# Patient Record
Sex: MALE | Race: ASIAN | NOT HISPANIC OR LATINO
[De-identification: names, ages, dates, MRNs, and addresses within clinical notes are randomized per-mention and may not be internally consistent; named-entity substitution may affect disease eponyms.]

---

## 2022-05-09 PROBLEM — Z00.00 ENCOUNTER FOR PREVENTIVE HEALTH EXAMINATION: Status: ACTIVE | Noted: 2022-05-09

## 2022-05-17 ENCOUNTER — APPOINTMENT (OUTPATIENT)
Dept: GERIATRICS | Facility: CLINIC | Age: 87
End: 2022-05-17
Payer: COMMERCIAL

## 2022-05-17 ENCOUNTER — NON-APPOINTMENT (OUTPATIENT)
Age: 87
End: 2022-05-17

## 2022-05-17 VITALS
HEIGHT: 63 IN | DIASTOLIC BLOOD PRESSURE: 60 MMHG | HEART RATE: 80 BPM | WEIGHT: 105 LBS | OXYGEN SATURATION: 97 % | BODY MASS INDEX: 18.61 KG/M2 | SYSTOLIC BLOOD PRESSURE: 100 MMHG

## 2022-05-17 DIAGNOSIS — F51.04 PSYCHOPHYSIOLOGIC INSOMNIA: ICD-10-CM

## 2022-05-17 DIAGNOSIS — K80.20 CALCULUS OF GALLBLADDER W/OUT CHOLECYSTITIS W/OUT OBSTRUCTION: ICD-10-CM

## 2022-05-17 DIAGNOSIS — Z87.438 PERSONAL HISTORY OF OTHER DISEASES OF MALE GENITAL ORGANS: ICD-10-CM

## 2022-05-17 DIAGNOSIS — J30.2 OTHER SEASONAL ALLERGIC RHINITIS: ICD-10-CM

## 2022-05-17 PROCEDURE — 99205 OFFICE O/P NEW HI 60 MIN: CPT

## 2022-05-17 PROCEDURE — 99072 ADDL SUPL MATRL&STAF TM PHE: CPT

## 2022-05-17 RX ORDER — BISACODYL 5 MG/1
5 TABLET ORAL
Refills: 0 | Status: ACTIVE | COMMUNITY
Start: 2022-05-17

## 2022-05-17 NOTE — ASSESSMENT
[FreeTextEntry1] : pt with complicated medical history as above\par will try to consolidate care/communications at [place]\par needs pulmonary and cardiologist input\par consider consult sanaz wray AAA - 5.3 cm\par check on pneumonia vaccines status\par refuses  home PT today\par will check labs today\par debrox rt ear\par

## 2022-05-17 NOTE — HISTORY OF PRESENT ILLNESS
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

## 2022-05-17 NOTE — PHYSICAL EXAM
[Alert] : alert [No Acute Distress] : in no acute distress [No Respiratory Distress] : no respiratory distress [No Acc Muscle Use] : no accessory muscle use [Respiration, Rhythm And Depth] : normal respiratory rhythm and effort [Auscultation Breath Sounds / Voice Sounds] : lungs were clear to auscultation bilaterally [Normal S1, S2] : normal S1 and S2 [Heart Rate And Rhythm] : heart rate was normal and rhythm regular [Edema] : edema was not present [Abdomen Soft] : soft [Cervical Lymph Nodes Enlarged Posterior Bilaterally] : posterior cervical [Supraclavicular Lymph Nodes Enlarged Bilaterally] : supraclavicular [Normal] : normal skin color and pigmentation [] : no rash [Skin Lesions] : no skin lesions [de-identified] : thin frail elderly man - needs assist to stand and transfer [de-identified] : huge impacted cerumen on rt [de-identified] : midline surgical scar [de-identified] : nails clipped [de-identified] : nees assistance

## 2022-05-17 NOTE — REASON FOR VISIT
[Initial Evaluation] : an initial evaluation [Family Member] : family member [FreeTextEntry3] : friend -

## 2022-05-17 NOTE — REVIEW OF SYSTEMS
[Recent Weight Loss (___ Lbs)] : recent [unfilled] ~Ulb weight loss [Feeling Tired] : feeling tired [Eyesight Problems] : eyesight problems [Loss Of Hearing] : hearing loss [Shortness Of Breath] : shortness of breath [Incontinence] : incontinence [Arthralgias] : arthralgias [Joint Stiffness] : joint stiffness [Difficulty Walking] : difficulty walking [Muscle Weakness] : muscle weakness [Negative] : Heme/Lymph [FreeTextEntry2] : eating better now [FreeTextEntry3] : has optho appt [FreeTextEntry6] : phlegm

## 2022-05-19 ENCOUNTER — LABORATORY RESULT (OUTPATIENT)
Age: 87
End: 2022-05-19

## 2022-07-21 ENCOUNTER — APPOINTMENT (OUTPATIENT)
Dept: GERIATRICS | Facility: HOME HEALTH | Age: 87
End: 2022-07-21

## 2022-07-21 DIAGNOSIS — K59.09 OTHER CONSTIPATION: ICD-10-CM

## 2022-07-21 PROCEDURE — 99349 HOME/RES VST EST MOD MDM 40: CPT

## 2022-07-21 RX ORDER — IPRATROPIUM BROMIDE 42 UG/1
0.06 SPRAY NASAL 3 TIMES DAILY
Qty: 1 | Refills: 5 | Status: ACTIVE | COMMUNITY
Start: 2022-07-21 | End: 1900-01-01

## 2022-07-21 NOTE — ASSESSMENT
[FreeTextEntry1] : pt with complicated medical history as above\par will try to consolidate care/communications at [place]\par needs pulmonary and cardiologist input\par consider consult sanaz wray AAA - 5.3 cm\par check on pneumonia vaccines status\par refuses  home PT today\par will check labs today\par debrox rt ear\par \par \par 7/21/22\par pt is feeling much better\par does not want intervention on aneurysm at this time\par walking better -\par breathing better\par labs wnl\par has all meds

## 2022-07-21 NOTE — REVIEW OF SYSTEMS
[Feeling Tired] : feeling tired [Recent Weight Loss (___ Lbs)] : recent [unfilled] ~Ulb weight loss [Eyesight Problems] : eyesight problems [Loss Of Hearing] : hearing loss [Incontinence] : incontinence [Arthralgias] : arthralgias [Joint Stiffness] : joint stiffness [Difficulty Walking] : difficulty walking [Muscle Weakness] : muscle weakness [Negative] : Heme/Lymph [Constipation] : constipation [FreeTextEntry2] : eating better now [FreeTextEntry3] : has optho appt [FreeTextEntry6] : phlegm [FreeTextEntry7] : soft stool but only every few days - uses senna [FreeTextEntry9] : walking some in home [de-identified] : better

## 2022-07-21 NOTE — HISTORY OF PRESENT ILLNESS
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\par h/o aortic valve replaced in [place] 2007\par elevated bilirubin - mild ascites, gallstones\par \par he had covid  vaccines - booster  in April\par had pneumonia vaccines (?details)\par \par pmd - Dr CARSON - [phone] \par Dr Tapia - cards\par \par retired     \par \par 7/21/22\par pt is seen at home with wife and daughter\par I am new MD for him\par he is feeling very  well\par no shortness of breath\par no chest pain\par did not use debrox drops\par brought copy of labs for for him and reviewed\par encourage banaana and is on extra Kdur tabs\par \par eating well\par went to podiatry -\par got debrox but did not use\par his pills are all blister paked - complinace is good

## 2022-07-21 NOTE — PHYSICAL EXAM
[Alert] : alert [No Respiratory Distress] : no respiratory distress [No Acc Muscle Use] : no accessory muscle use [Respiration, Rhythm And Depth] : normal respiratory rhythm and effort [Auscultation Breath Sounds / Voice Sounds] : lungs were clear to auscultation bilaterally [Normal S1, S2] : normal S1 and S2 [Heart Rate And Rhythm] : heart rate was normal and rhythm regular [Edema] : edema was not present [Abdomen Soft] : soft [Cervical Lymph Nodes Enlarged Posterior Bilaterally] : posterior cervical [Supraclavicular Lymph Nodes Enlarged Bilaterally] : supraclavicular [Normal] : normal skin color and pigmentation [] : no rash [Skin Lesions] : no skin lesions [de-identified] : thin frail elderly man - needs assist to stand and transfer - doing better can walk across room [de-identified] : huge impacted cerumen on rt - got tiny bit out [de-identified] : midline surgical scar [de-identified] : nails clipped [de-identified] : nees assistance

## 2022-09-29 ENCOUNTER — APPOINTMENT (OUTPATIENT)
Dept: GERIATRICS | Facility: HOME HEALTH | Age: 87
End: 2022-09-29

## 2022-09-29 VITALS
DIASTOLIC BLOOD PRESSURE: 65 MMHG | HEART RATE: 86 BPM | TEMPERATURE: 96.6 F | SYSTOLIC BLOOD PRESSURE: 105 MMHG | OXYGEN SATURATION: 99 % | WEIGHT: 101.8 LBS | BODY MASS INDEX: 18.03 KG/M2

## 2022-09-29 PROCEDURE — 99349 HOME/RES VST EST MOD MDM 40: CPT

## 2022-09-29 PROCEDURE — 90662 IIV NO PRSV INCREASED AG IM: CPT

## 2022-09-29 PROCEDURE — G0008: CPT

## 2022-09-29 NOTE — REVIEW OF SYSTEMS
[Feeling Tired] : feeling tired [Eyesight Problems] : eyesight problems [Loss Of Hearing] : hearing loss [Constipation] : constipation [Incontinence] : incontinence [Arthralgias] : arthralgias [Joint Stiffness] : joint stiffness [Difficulty Walking] : difficulty walking [Muscle Weakness] : muscle weakness [Recent Weight Loss (___ Lbs)] : recent [unfilled] ~Ulb weight loss [Chest Pain] : no chest pain [Palpitations] : no palpitations [Lower Ext Edema] : no extremity edema [Shortness Of Breath] : no shortness of breath [Negative] : Integumentary [FreeTextEntry2] : has boost [FreeTextEntry3] : has optho appt and cardiologist  - pending Nov [FreeTextEntry6] : phlegm - in the night time  [FreeTextEntry9] : walking some in home- better [de-identified] : better

## 2022-09-29 NOTE — PHYSICAL EXAM
[Alert] : alert [No Respiratory Distress] : no respiratory distress [No Acc Muscle Use] : no accessory muscle use [Respiration, Rhythm And Depth] : normal respiratory rhythm and effort [Auscultation Breath Sounds / Voice Sounds] : lungs were clear to auscultation bilaterally [Normal S1, S2] : normal S1 and S2 [Heart Rate And Rhythm] : heart rate was normal and rhythm regular [Edema] : edema was not present [Abdomen Soft] : soft [Cervical Lymph Nodes Enlarged Posterior Bilaterally] : posterior cervical [Supraclavicular Lymph Nodes Enlarged Bilaterally] : supraclavicular [Normal] : normal skin color and pigmentation [] : no rash [Skin Lesions] : no skin lesions [de-identified] : thin frail elderly man - needs assist to stand and transfer - doing better can walk across room [de-identified] : huge impacted cerumen on rt - did not yet do debrox [de-identified] : midline surgical scar [de-identified] : nails clipped [de-identified] : needs assistance

## 2022-09-29 NOTE — ASSESSMENT
[FreeTextEntry1] : pt with complicated medical history as above\par will try to consolidate care/communications at [place]\par needs pulmonary and cardiologist input\par consider consult sanaz wray AAA - 5.3 cm\par check on pneumonia vaccines status\par refuses  home PT today\par will check labs today\par debrox rt ear\par \par \par 7/21/22\par pt is feeling much better\par does not want intervention on aneurysm at this time\par walking better -\par breathing better\par labs wnl\par has all meds\par \par 9/29/22\par given flu vaccine left amr\par needs covid vaccines\par check on pneumonia vaccines - daughter states she will do\par needs to do   debrox\par vss\par encouraged to drink boost - weight is down 3 lbs\par afib stable - \par vss

## 2022-09-29 NOTE — HISTORY OF PRESENT ILLNESS
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\par h/o aortic valve replaced in [place] 2007\par elevated bilirubin - mild ascites, gallstones\par \par he had covid  vaccines - booster  in April\par had pneumonia vaccines (?details)\par \par pmd - Dr CARSON - [phone] \par Dr Tapia - cards\par \par retired     \par \par 7/21/22\par pt is seen at home with wife and daughter\par I am new MD for him\par he is feeling very  well\par no shortness of breath\par no chest pain\par did not use debrox drops\par brought copy of labs for for him and reviewed\par encourage banaana and is on extra Kdur tabs\par \par eating well\par went to podiatry -\par got debrox but did not use\par his pills are all blister packed - complinace is good\par \par 9/29/22\par daughter\par [phone] with him and wife\par needs flu vaccine\par has been stable no hospitalizations\par need to get info re pneumonia  vaccines\par needs covid booster\par \par did not use debrox - will do next time

## 2022-10-18 ENCOUNTER — NON-APPOINTMENT (OUTPATIENT)
Age: 87
End: 2022-10-18

## 2022-11-03 RX ORDER — IPRATROPIUM BROMIDE 42 UG/1
0.06 SPRAY NASAL 3 TIMES DAILY
Qty: 540 | Refills: 3 | Status: ACTIVE | COMMUNITY
Start: 2022-05-17 | End: 1900-01-01

## 2022-11-03 RX ORDER — AZELASTINE HYDROCHLORIDE AND FLUTICASONE PROPIONATE 137; 50 UG/1; UG/1
137-50 SPRAY, METERED NASAL TWICE DAILY
Qty: 3 | Refills: 3 | Status: ACTIVE | COMMUNITY
Start: 2022-05-17 | End: 1900-01-01

## 2022-11-03 RX ORDER — ALBUTEROL SULFATE 90 UG/1
108 (90 BASE) AEROSOL, METERED RESPIRATORY (INHALATION)
Qty: 3 | Refills: 1 | Status: ACTIVE | COMMUNITY
Start: 2022-07-21 | End: 1900-01-01

## 2022-11-03 RX ORDER — MULTIVIT-MIN/FOLIC/VIT K/LYCOP 400-300MCG
10 MCG TABLET ORAL DAILY
Qty: 90 | Refills: 3 | Status: ACTIVE | COMMUNITY
Start: 2022-05-17

## 2022-11-03 RX ORDER — SENNOSIDES 8.6 MG
8.6 TABLET ORAL
Qty: 180 | Refills: 3 | Status: ACTIVE | COMMUNITY
Start: 2022-05-17 | End: 1900-01-01

## 2022-11-03 RX ORDER — FLUTICASONE FUROATE, UMECLIDINIUM BROMIDE AND VILANTEROL TRIFENATATE 200; 62.5; 25 UG/1; UG/1; UG/1
200-62.5-25 POWDER RESPIRATORY (INHALATION)
Qty: 3 | Refills: 3 | Status: ACTIVE | COMMUNITY
Start: 2022-05-17 | End: 1900-01-01

## 2022-11-03 RX ORDER — CHLORHEXIDINE GLUCONATE 4 %
1000 LIQUID (ML) TOPICAL
Qty: 90 | Refills: 3 | Status: ACTIVE | COMMUNITY
Start: 2022-05-17

## 2022-11-07 ENCOUNTER — OFFICE (OUTPATIENT)
Dept: URBAN - METROPOLITAN AREA CLINIC 122 | Facility: CLINIC | Age: 87
Setting detail: OPHTHALMOLOGY
End: 2022-11-07
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
## 2022-11-07 PROCEDURE — NO SHOW FE NO SHOW FEE: Performed by: OPHTHALMOLOGY

## 2022-12-14 ENCOUNTER — APPOINTMENT (OUTPATIENT)
Dept: GERIATRICS | Facility: HOME HEALTH | Age: 87
End: 2022-12-14

## 2022-12-14 VITALS
OXYGEN SATURATION: 98 % | DIASTOLIC BLOOD PRESSURE: 70 MMHG | SYSTOLIC BLOOD PRESSURE: 110 MMHG | BODY MASS INDEX: 18.6 KG/M2 | HEART RATE: 91 BPM | WEIGHT: 105 LBS

## 2022-12-14 PROCEDURE — G0009: CPT

## 2022-12-14 PROCEDURE — 99348 HOME/RES VST EST LOW MDM 30: CPT

## 2022-12-14 PROCEDURE — 90677 PCV20 VACCINE IM: CPT

## 2022-12-14 NOTE — REVIEW OF SYSTEMS
[Feeling Tired] : feeling tired [Eyesight Problems] : eyesight problems [Loss Of Hearing] : hearing loss [Constipation] : constipation [Incontinence] : incontinence [Arthralgias] : arthralgias [Joint Stiffness] : joint stiffness [Difficulty Walking] : difficulty walking [Muscle Weakness] : muscle weakness [Negative] : Heme/Lymph [Recent Weight Gain (___ Lbs)] : recent [unfilled] ~Ulb weight gain [Skin Wound] : skin wound [Recent Weight Loss (___ Lbs)] : no recent weight loss [Chest Pain] : no chest pain [Palpitations] : no palpitations [Lower Ext Edema] : no extremity edema [Shortness Of Breath] : no shortness of breath [FreeTextEntry2] : has boost [FreeTextEntry3] : has optho appt and cardiologist  - pending Nov [FreeTextEntry6] : phlegm - in the night time  [FreeTextEntry9] : walking some in home- better [de-identified] : mid chest [de-identified] : better

## 2022-12-14 NOTE — HISTORY OF PRESENT ILLNESS
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\par h/o aortic valve replaced in [place] 2007\par elevated bilirubin - mild ascites, gallstones\par \par he had covid  vaccines - booster  in April\par had pneumonia vaccines (?details)\par \par pmd - Dr CARSON - [phone] \par Dr Tapia - cards\par \par retired     \par \par 7/21/22\par pt is seen at home with wife and daughter\par I am new MD for him\par he is feeling very  well\par no shortness of breath\par no chest pain\par did not use debrox drops\par brought copy of labs for for him and reviewed\par encourage banaana and is on extra Kdur tabs\par \par eating well\par went to podiatry -\par got debrox but did not use\par his pills are all blister packed - complinace is good\par \par 9/29/22\par daughter\par [phone] with him and wife\par needs flu vaccine\par has been stable no hospitalizations\par need to get info re pneumonia  vaccines\par needs covid booster\par \par did not use debrox - will do next time\par \par 12/14/22\par pt has been well\par eating well\par moved apts to be more accesible\par uses walker - sob on exertion\par going to ear dr today\par needs prevnar 20 - had prevnar 13 only in 2018\par has had flu vaccine and covid booster\par there is infected skin lesion mid chest - photo taken

## 2022-12-14 NOTE — PHYSICAL EXAM
[Alert] : alert [No Respiratory Distress] : no respiratory distress [No Acc Muscle Use] : no accessory muscle use [Respiration, Rhythm And Depth] : normal respiratory rhythm and effort [Auscultation Breath Sounds / Voice Sounds] : lungs were clear to auscultation bilaterally [Normal S1, S2] : normal S1 and S2 [Heart Rate And Rhythm] : heart rate was normal and rhythm regular [Edema] : edema was not present [Cervical Lymph Nodes Enlarged Posterior Bilaterally] : posterior cervical [Supraclavicular Lymph Nodes Enlarged Bilaterally] : supraclavicular [] : no rash [Skin Lesions] : no skin lesions [Normal] : normal affect and normal mood [de-identified] : thin frail elderly man - needs assist to stand and transfer - doing better can walk across room [de-identified] : midline surgical scar, small infected red area - photo taken [de-identified] : needs assistance

## 2022-12-14 NOTE — ASSESSMENT
[FreeTextEntry1] : pt with complicated medical history as above\par will try to consolidate care/communications at [facility]\par needs pulmonary and cardiologist input\par consider consult sanaz wray AAA - 5.3 cm\par check on pneumonia vaccines status\par refuses  home PT today\par will check labs today\par debrox rt ear\par \par \par 7/21/22\par pt is feeling much better\par does not want intervention on aneurysm at this time\par walking better -\par breathing better\par labs wnl\par has all meds\par \par 9/29/22\par given flu vaccine left amr\par needs covid vaccines\par check on pneumonia vaccines - daughter states she will do\par needs to do   debrox\par vss\par encouraged to drink boost - weight is down 3 lbs\par afib stable - \par vss\par \par 12/14/22\par pt is doing well\par gave prevnar 20 in left arm\par has had flu and  covid vaccines\par going to ENT\par called in bactroban for skin infection - took photo

## 2023-02-08 ENCOUNTER — APPOINTMENT (OUTPATIENT)
Dept: GERIATRICS | Facility: HOME HEALTH | Age: 88
End: 2023-02-08
Payer: COMMERCIAL

## 2023-02-08 VITALS
BODY MASS INDEX: 18.81 KG/M2 | WEIGHT: 106.2 LBS | HEART RATE: 86 BPM | SYSTOLIC BLOOD PRESSURE: 110 MMHG | OXYGEN SATURATION: 98 % | DIASTOLIC BLOOD PRESSURE: 70 MMHG | TEMPERATURE: 97.3 F

## 2023-02-08 DIAGNOSIS — B96.89 LOCAL INFECTION OF THE SKIN AND SUBCUTANEOUS TISSUE, UNSPECIFIED: ICD-10-CM

## 2023-02-08 DIAGNOSIS — L08.9 LOCAL INFECTION OF THE SKIN AND SUBCUTANEOUS TISSUE, UNSPECIFIED: ICD-10-CM

## 2023-02-08 PROCEDURE — 99349 HOME/RES VST EST MOD MDM 40: CPT

## 2023-02-08 NOTE — PHYSICAL EXAM
[Alert] : alert [No Respiratory Distress] : no respiratory distress [No Acc Muscle Use] : no accessory muscle use [Respiration, Rhythm And Depth] : normal respiratory rhythm and effort [Auscultation Breath Sounds / Voice Sounds] : lungs were clear to auscultation bilaterally [Normal S1, S2] : normal S1 and S2 [Heart Rate And Rhythm] : heart rate was normal and rhythm regular [Edema] : edema was not present [Cervical Lymph Nodes Enlarged Posterior Bilaterally] : posterior cervical [Supraclavicular Lymph Nodes Enlarged Bilaterally] : supraclavicular [] : no rash [Skin Lesions] : no skin lesions [Normal] : normal affect and normal mood [de-identified] : thin frail elderly man - needs assist to stand and transfer - doing better can walk across room [de-identified] : midline surgical scar, small infected red area - photo taken - worse errythema tender - since 12/14 [de-identified] : needs assistance

## 2023-02-08 NOTE — ASSESSMENT
[FreeTextEntry1] : pt with complicated medical history as above\par will try to consolidate care/communications at [place]\par needs pulmonary and cardiologist input\par consider consult sanaz camarena re AAA - 5.3 cm\par check on pneumonia vaccines status\par refuses  home PT today\par will check labs today\par debrox rt ear\par \par \par 7/21/22\par pt is feeling much better\par does not want intervention on aneurysm at this time\par walking better -\par breathing better\par labs wnl\par has all meds\par \par 9/29/22\par given flu vaccine left amr\par needs covid vaccines\par check on pneumonia vaccines - daughter states she will do\par needs to do   debrox\par vss\par encouraged to drink boost - weight is down 3 lbs\par afib stable - \par vss\par \par 12/14/22\par pt is doing well\par gave prevnar 20 in left arm\par has had flu and  covid vaccines\par going to ENT\par called in bactroban for skin infection - took photo\par \par 2/8/23\par skin lesion is worse\par did not use bactroban\par if does not resovle - needs derm\par daughter aware\par \par checking labs\par \par using mucinex\par \par [place] - stable\par copd stable with med\par \par meds are blister packed\par

## 2023-02-08 NOTE — REVIEW OF SYSTEMS
[Feeling Tired] : feeling tired [Recent Weight Gain (___ Lbs)] : recent [unfilled] ~Ulb weight gain [Eyesight Problems] : eyesight problems [Loss Of Hearing] : hearing loss [Constipation] : constipation [Incontinence] : incontinence [Arthralgias] : arthralgias [Joint Stiffness] : joint stiffness [Skin Wound] : skin wound [Difficulty Walking] : difficulty walking [Muscle Weakness] : muscle weakness [Negative] : Heme/Lymph [Recent Weight Loss (___ Lbs)] : no recent weight loss [Chest Pain] : no chest pain [Palpitations] : no palpitations [Lower Ext Edema] : no extremity edema [Shortness Of Breath] : no shortness of breath [FreeTextEntry2] : has boost [FreeTextEntry3] : has optho appt and cardiologist  - pending Nov [FreeTextEntry6] : phlegm - in the night time  [FreeTextEntry9] : walking some in home- better [de-identified] : mid chest [de-identified] : better

## 2023-02-08 NOTE — HISTORY OF PRESENT ILLNESS
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\par h/o aortic valve replaced in [place] 2007\par elevated bilirubin - mild ascites, gallstones\par \par he had covid  vaccines - booster  in April\par had pneumonia vaccines (?details)\par \par pmd - Dr CARSON - [phone] \par Dr Tapia - cards\par \par retired     \par \par 7/21/22\par pt is seen at home with wife and daughter\par I am new MD for him\par he is feeling very  well\par no shortness of breath\par no chest pain\par did not use debrox drops\par brought copy of labs for for him and reviewed\par encourage banaana and is on extra Kdur tabs\par \par eating well\par went to podiatry -\par got debrox but did not use\par his pills are all blister packed - complinace is good\par \par 9/29/22\par daughter\par [phone] with him and wife\par needs flu vaccine\par has been stable no hospitalizations\par need to get info re pneumonia  vaccines\par needs covid booster\par \par did not use debrox - will do next time\par \par 12/14/22\par pt has been well\par eating well\par moved apts to be more accesible\par uses walker - sob on exertion\par going to ear dr today\par needs prevnar 20 - had prevnar 13 only in 2018\par has had flu vaccine and covid booster\par there is infected skin lesion mid chest - photo taken\par \par 2/8/23\par pt has been well\par no hospitalizations\par doing calligraphy\par breathing is better\par does follow with cardiologist\par has chronic cough sputum\par daughter bought mucinex but has not tried\par skin lesion on ches appears more inflamed

## 2023-02-10 ENCOUNTER — LABORATORY RESULT (OUTPATIENT)
Age: 88
End: 2023-02-10

## 2023-02-13 ENCOUNTER — LABORATORY RESULT (OUTPATIENT)
Age: 88
End: 2023-02-13

## 2023-02-15 ENCOUNTER — LABORATORY RESULT (OUTPATIENT)
Age: 88
End: 2023-02-15

## 2023-02-16 ENCOUNTER — LABORATORY RESULT (OUTPATIENT)
Age: 88
End: 2023-02-16

## 2023-02-20 ENCOUNTER — LABORATORY RESULT (OUTPATIENT)
Age: 88
End: 2023-02-20

## 2023-05-03 ENCOUNTER — APPOINTMENT (OUTPATIENT)
Dept: GERIATRICS | Facility: HOME HEALTH | Age: 88
End: 2023-05-03
Payer: COMMERCIAL

## 2023-05-03 VITALS
OXYGEN SATURATION: 97 % | WEIGHT: 109.4 LBS | HEART RATE: 83 BPM | DIASTOLIC BLOOD PRESSURE: 70 MMHG | BODY MASS INDEX: 19.38 KG/M2 | SYSTOLIC BLOOD PRESSURE: 115 MMHG | TEMPERATURE: 97.4 F

## 2023-05-03 DIAGNOSIS — I71.20 THORACIC AORTIC ANEURYSM, WITHOUT RUPTURE, UNSPECIFIED: ICD-10-CM

## 2023-05-03 PROCEDURE — 99349 HOME/RES VST EST MOD MDM 40: CPT

## 2023-05-03 NOTE — PHYSICAL EXAM
[Alert] : alert [No Respiratory Distress] : no respiratory distress [No Acc Muscle Use] : no accessory muscle use [Respiration, Rhythm And Depth] : normal respiratory rhythm and effort [Auscultation Breath Sounds / Voice Sounds] : lungs were clear to auscultation bilaterally [Normal S1, S2] : normal S1 and S2 [Heart Rate And Rhythm] : heart rate was normal and rhythm regular [Edema] : edema was not present [Cervical Lymph Nodes Enlarged Posterior Bilaterally] : posterior cervical [Supraclavicular Lymph Nodes Enlarged Bilaterally] : supraclavicular [] : no rash [Skin Lesions] : no skin lesions [Normal] : normal affect and normal mood [No Oral Pallor] : no oral pallor [No Oral Cyanosis] : no oral cyanosis [Normal Outer Ear/Nose] : the ears and nose were normal in appearance [Normal Lips/Gums] : the lips and gums were normal [No Spinal Tenderness] : no spinal tenderness [de-identified] : thin frail elderly man - looks well - doing better can walk across room [de-identified] : midline surgical scar, small infected red area - photo taken - worse errythema tender - since 12/14 [de-identified] : needs assistance [de-identified] : lesion on chest not healing

## 2023-05-03 NOTE — REVIEW OF SYSTEMS
[Feeling Tired] : feeling tired [Recent Weight Gain (___ Lbs)] : recent [unfilled] ~Ulb weight gain [Eyesight Problems] : eyesight problems [Loss Of Hearing] : hearing loss [Constipation] : constipation [Incontinence] : incontinence [Arthralgias] : arthralgias [Joint Stiffness] : joint stiffness [Skin Wound] : skin wound [Difficulty Walking] : difficulty walking [Muscle Weakness] : muscle weakness [Negative] : Heme/Lymph [Recent Weight Loss (___ Lbs)] : no recent weight loss [Chest Pain] : no chest pain [Palpitations] : no palpitations [Lower Ext Edema] : no extremity edema [Shortness Of Breath] : no shortness of breath [FreeTextEntry2] : has boost [FreeTextEntry3] : has optho appt and cardiologist  - pending Nov [FreeTextEntry6] : phlegm - in the night time  [FreeTextEntry9] : walking some in home- better [de-identified] : mid chest [de-identified] : better

## 2023-05-03 NOTE — HISTORY OF PRESENT ILLNESS
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\par h/o aortic valve replaced in [place] 2007\par elevated bilirubin - mild ascites, gallstones\par \par he had covid  vaccines - booster  in April\par had pneumonia vaccines (?details)\par \par pmd - Dr CARSON - [phone] \par Dr Tapia - cards\par \par retired     \par \par 7/21/22\par pt is seen at home with wife and daughter\par I am new MD for him\par he is feeling very  well\par no shortness of breath\par no chest pain\par did not use debrox drops\par brought copy of labs for for him and reviewed\par encourage banaana and is on extra Kdur tabs\par \par eating well\par went to podiatry -\par got debrox but did not use\par his pills are all blister packed - complinace is good\par \par 9/29/22\par daughter\par [phone] with him and wife\par needs flu vaccine\par has been stable no hospitalizations\par need to get info re pneumonia  vaccines\par needs covid booster\par \par did not use debrox - will do next time\par \par 12/14/22\par pt has been well\par eating well\par moved apts to be more accesible\par uses walker - sob on exertion\par going to ear dr today\par needs prevnar 20 - had prevnar 13 only in 2018\par has had flu vaccine and covid booster\par there is infected skin lesion mid chest - photo taken\par \par 2/8/23\par pt has been well\par no hospitalizations\par doing calligraphy\par breathing is better\par does follow with cardiologist\par has chronic cough sputum\par daughter bought mucinex but has not tried\par skin lesion on chest appears more inflamed\par \par 5/3/23\par here with wife and daughter\par pt hss been feeling well\par denies sob on exertion\par [facility]\par had cardiac check up and got good report\par had labs in feb\par

## 2023-05-03 NOTE — ASSESSMENT
[FreeTextEntry1] : pt with complicated medical history as above\par will try to consolidate care/communications at [place]\par needs pulmonary and cardiologist input\par consider consult sanaz camarena re AAA - 5.3 cm\par check on pneumonia vaccines status\par refuses  home PT today\par will check labs today\par debrox rt ear\par \par \par 7/21/22\par pt is feeling much better\par does not want intervention on aneurysm at this time\par walking better -\par breathing better\par labs wnl\par has all meds\par \par 9/29/22\par given flu vaccine left amr\par needs covid vaccines\par check on pneumonia vaccines - daughter states she will do\par needs to do   debrox\par vss\par encouraged to drink boost - weight is down 3 lbs\par afib stable - \par vss\par \par 12/14/22\par pt is doing well\par gave prevnar 20 in left arm\par has had flu and  covid vaccines\par going to ENT\par called in bactroban for skin infection - took photo\par \par 2/8/23\par skin lesion is worse\par did not use bactroban\par if does not resovle - needs derm\par daughter aware\par \par checking labs\par \par using mucinex\par \par [place] - stable\par copd stable with med\par \par meds are blister packed\par \par 5/3/23\par skin lesion on chest not healed\par daughter has no of derm - enoc call\par cardiac -- stable\par labs  good\par copd stable\par has all meds\par \par needs covid booster\par

## 2023-06-28 ENCOUNTER — RESULT REVIEW (OUTPATIENT)
Age: 88
End: 2023-06-28

## 2023-07-11 ENCOUNTER — APPOINTMENT (OUTPATIENT)
Dept: PULMONOLOGY | Facility: CLINIC | Age: 88
End: 2023-07-11
Payer: COMMERCIAL

## 2023-07-11 VITALS
SYSTOLIC BLOOD PRESSURE: 102 MMHG | OXYGEN SATURATION: 97 % | DIASTOLIC BLOOD PRESSURE: 60 MMHG | WEIGHT: 103 LBS | HEART RATE: 93 BPM | TEMPERATURE: 96.6 F | BODY MASS INDEX: 18.25 KG/M2

## 2023-07-11 PROCEDURE — 99214 OFFICE O/P EST MOD 30 MIN: CPT

## 2023-07-11 NOTE — ASSESSMENT
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  I do not think that thoracentesis would give the patient subjective improvement.  The lung will not expand after the procedure and the fluid will just reaccumulate shortly.  I do not believe that a permanent catheter drainage is indicated.  Patient is to follow-up with chest x-rays in 6 months but I do not expect any change.

## 2023-07-11 NOTE — PHYSICAL EXAM
[No Acute Distress] : no acute distress [Well Nourished] : well nourished [Well Developed] : well developed [Well-Appearing] : well-appearing [Normal Sclera/Conjunctiva] : normal sclera/conjunctiva [PERRL] : pupils equal round and reactive to light [EOMI] : extraocular movements intact [Normal Outer Ear/Nose] : the outer ears and nose were normal in appearance [Normal Oropharynx] : the oropharynx was normal [No JVD] : no jugular venous distention [No Lymphadenopathy] : no lymphadenopathy [Supple] : supple [Thyroid Normal, No Nodules] : the thyroid was normal and there were no nodules present [No Respiratory Distress] : no respiratory distress  [No Accessory Muscle Use] : no accessory muscle use [Normal Rate] : normal rate  [Regular Rhythm] : with a regular rhythm [Normal S1, S2] : normal S1 and S2 [No Murmur] : no murmur heard [No Carotid Bruits] : no carotid bruits [No Abdominal Bruit] : a ~M bruit was not heard ~T in the abdomen [No Varicosities] : no varicosities [Pedal Pulses Present] : the pedal pulses are present [No Edema] : there was no peripheral edema [No Palpable Aorta] : no palpable aorta [No Extremity Clubbing/Cyanosis] : no extremity clubbing/cyanosis [Soft] : abdomen soft [Non Tender] : non-tender [Non-distended] : non-distended [No Masses] : no abdominal mass palpated [No HSM] : no HSM [Normal Bowel Sounds] : normal bowel sounds [Normal Posterior Cervical Nodes] : no posterior cervical lymphadenopathy [Normal Anterior Cervical Nodes] : no anterior cervical lymphadenopathy [No Focal Deficits] : no focal deficits [Normal Gait] : normal gait [Normal Affect] : the affect was normal [Normal Insight/Judgement] : insight and judgment were intact [de-identified] : sl decreased bs's r base

## 2023-07-11 NOTE — HISTORY OF PRESENT ILLNESS
[FreeTextEntry1] : Fluid in lungs [de-identified] :  this is a 89-year-old male with a vague past history.  He apparently had a valve replacement in [place] 15 years ago.  He carries a diagnosis of atrial fibrillation, heart failure with preserved ejection fraction.  He has had thoracentesis done in the past I believe the last time was February 2022.  The details are unclear but I reviewed the chest x-ray from that time.  After the thoracentesis the lung did not expand.  He now comes in because he has a slight cough over the past month.  A chest x-ray was done which reveals small to moderate partially loculated right effusion.  Patient does not walk very much and gets slightly short of breath walking from room to room at home.  He denies chest pain, orthopnea or edema.  Medications were reviewed.

## 2023-07-12 ENCOUNTER — RX RENEWAL (OUTPATIENT)
Age: 88
End: 2023-07-12

## 2023-07-12 ENCOUNTER — APPOINTMENT (OUTPATIENT)
Dept: GERIATRICS | Facility: HOME HEALTH | Age: 88
End: 2023-07-12
Payer: COMMERCIAL

## 2023-07-12 VITALS
DIASTOLIC BLOOD PRESSURE: 80 MMHG | OXYGEN SATURATION: 100 % | SYSTOLIC BLOOD PRESSURE: 102 MMHG | WEIGHT: 112.6 LBS | HEART RATE: 89 BPM | BODY MASS INDEX: 19.95 KG/M2 | TEMPERATURE: 97.2 F

## 2023-07-12 DIAGNOSIS — J90 PLEURAL EFFUSION, NOT ELSEWHERE CLASSIFIED: ICD-10-CM

## 2023-07-12 PROCEDURE — 99349 HOME/RES VST EST MOD MDM 40: CPT

## 2023-07-12 NOTE — PHYSICAL EXAM
[Alert] : alert [No Oral Pallor] : no oral pallor [No Oral Cyanosis] : no oral cyanosis [Normal Outer Ear/Nose] : the ears and nose were normal in appearance [Normal Lips/Gums] : the lips and gums were normal [No Respiratory Distress] : no respiratory distress [No Acc Muscle Use] : no accessory muscle use [Respiration, Rhythm And Depth] : normal respiratory rhythm and effort [Auscultation Breath Sounds / Voice Sounds] : lungs were clear to auscultation bilaterally [Normal S1, S2] : normal S1 and S2 [Heart Rate And Rhythm] : heart rate was normal and rhythm regular [Edema] : edema was not present [Cervical Lymph Nodes Enlarged Posterior Bilaterally] : posterior cervical [Supraclavicular Lymph Nodes Enlarged Bilaterally] : supraclavicular [No Spinal Tenderness] : no spinal tenderness [] : no rash [Skin Lesions] : no skin lesions [Normal] : normal affect and normal mood [de-identified] : thin frail elderly man - looks well - doing better can walk across room [de-identified] : decreased BS RLL [de-identified] : midline surgical scar, small infected red area - photo taken - worse errythema tender - since 12/14 [de-identified] : needs assistance [de-identified] : lesion on chest not healing

## 2023-07-12 NOTE — HISTORY OF PRESENT ILLNESS
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\par h/o aortic valve replaced in [place] 2007\par elevated bilirubin - mild ascites, gallstones\par \par he had covid  vaccines - booster  in April\par had pneumonia vaccines (?details)\par \par pmd - Dr CARSON - [phone] \par Dr Tapia - cards\par \par retired     \par \par 7/21/22\par pt is seen at home with wife and daughter\par I am new MD for him\par he is feeling very  well\par no shortness of breath\par no chest pain\par did not use debrox drops\par brought copy of labs for for him and reviewed\par encourage banaana and is on extra Kdur tabs\par \par eating well\par went to podiatry -\par got debrox but did not use\par his pills are all blister packed - complinace is good\par \par 9/29/22\par daughter\par [phone] with him and wife\par needs flu vaccine\par has been stable no hospitalizations\par need to get info re pneumonia  vaccines\par needs covid booster\par \par did not use debrox - will do next time\par \par 12/14/22\par pt has been well\par eating well\par moved apts to be more accesible\par uses walker - sob on exertion\par going to ear dr today\par needs prevnar 20 - had prevnar 13 only in 2018\par has had flu vaccine and covid booster\par there is infected skin lesion mid chest - photo taken\par \par 2/8/23\par pt has been well\par no hospitalizations\par doing calligraphy\par breathing is better\par does follow with cardiologist\par has chronic cough sputum\par daughter bought mucinex but has not tried\par skin lesion on chest appears more inflamed\par \par 5/3/23\par here with wife and daughter\par pt hss been feeling well\par denies sob on exertion\par [facility]\par had cardiac check up and got good report\par had labs in feb\par \par 7/12/23\par pt has had a cough\par he had a chest xray at [facility]  6/28/23has small/moderate loculated effusion - rt\par saw Dr Meyer yesterday - did not think tapping lung would help symptoms\par he is on his meds\par he uses mucinex for cough\par no fever\par not short of breath\par not coughing during my visit\par

## 2023-07-12 NOTE — REVIEW OF SYSTEMS
WDL [Feeling Tired] : feeling tired [Recent Weight Gain (___ Lbs)] : recent [unfilled] ~Ulb weight gain [Eyesight Problems] : eyesight problems [Loss Of Hearing] : hearing loss [Constipation] : constipation [Incontinence] : incontinence [Arthralgias] : arthralgias [Joint Stiffness] : joint stiffness [Skin Wound] : skin wound [Difficulty Walking] : difficulty walking [Muscle Weakness] : muscle weakness Not Applicable [Negative] : Heme/Lymph [Recent Weight Loss (___ Lbs)] : no recent weight loss [Chest Pain] : no chest pain [Palpitations] : no palpitations [Lower Ext Edema] : no extremity edema [Shortness Of Breath] : no shortness of breath [FreeTextEntry2] : has boost [FreeTextEntry3] : has optho appt and cardiologist  - pending Nov [FreeTextEntry6] : phlegm - in the night time  [FreeTextEntry9] : walking some in home- better [de-identified] : mid chest [de-identified] : better

## 2023-07-12 NOTE — ASSESSMENT
[FreeTextEntry1] : pt with complicated medical history as above\par will try to consolidate care/communications at [facility]\par needs pulmonary and cardiologist input\par consider consult sanaz camarena re AAA - 5.3 cm\par check on pneumonia vaccines status\par refuses  home PT today\par will check labs today\par debrox rt ear\par \par \par 7/21/22\par pt is feeling much better\par does not want intervention on aneurysm at this time\par walking better -\par breathing better\par labs wnl\par has all meds\par \par 9/29/22\par given flu vaccine left amr\par needs covid vaccines\par check on pneumonia vaccines - daughter states she will do\par needs to do   debrox\par vss\par encouraged to drink boost - weight is down 3 lbs\par afib stable - \par vss\par \par 12/14/22\par pt is doing well\par gave prevnar 20 in left arm\par has had flu and  covid vaccines\par going to ENT\par called in bactroban for skin infection - took photo\par \par 2/8/23\par skin lesion is worse\par did not use bactroban\par if does not resovle - needs derm\par daughter aware\par \par checking labs\par \par using mucinex\par \par cadiac - stable\par copd stable with med\par \par meds are blister packed\par \par 5/3/23\par skin lesion on chest not healed\par daughter has no of derm - enoc call\par cardiac -- stable\par labs  good\par copd stable\par has all meds\par \par needs covid booster\par \par 7/12/23\par still needs derm f/up\par saw dr fields - has pleural effusion\par but feelig well\par no utility in draining\par will cont current meds\par

## 2023-09-14 ENCOUNTER — APPOINTMENT (OUTPATIENT)
Dept: GERIATRICS | Facility: HOME HEALTH | Age: 88
End: 2023-09-14
Payer: COMMERCIAL

## 2023-09-14 VITALS
HEART RATE: 95 BPM | WEIGHT: 104.4 LBS | TEMPERATURE: 97 F | OXYGEN SATURATION: 98 % | BODY MASS INDEX: 18.49 KG/M2 | DIASTOLIC BLOOD PRESSURE: 70 MMHG | SYSTOLIC BLOOD PRESSURE: 115 MMHG

## 2023-09-14 DIAGNOSIS — Z23 ENCOUNTER FOR IMMUNIZATION: ICD-10-CM

## 2023-09-14 PROCEDURE — 90662 IIV NO PRSV INCREASED AG IM: CPT

## 2023-09-14 PROCEDURE — G0008: CPT

## 2023-09-14 PROCEDURE — 99348 HOME/RES VST EST LOW MDM 30: CPT | Mod: 25

## 2023-09-20 ENCOUNTER — RX RENEWAL (OUTPATIENT)
Age: 88
End: 2023-09-20

## 2023-09-25 ENCOUNTER — RX RENEWAL (OUTPATIENT)
Age: 88
End: 2023-09-25

## 2023-09-25 RX ORDER — MELATONIN 3 MG
3 CAPSULE ORAL
Qty: 90 | Refills: 3 | Status: ACTIVE | COMMUNITY
Start: 2022-05-17 | End: 1900-01-01

## 2023-10-04 ENCOUNTER — APPOINTMENT (OUTPATIENT)
Dept: SURGERY | Facility: CLINIC | Age: 88
End: 2023-10-04
Payer: COMMERCIAL

## 2023-10-04 ENCOUNTER — NON-APPOINTMENT (OUTPATIENT)
Age: 88
End: 2023-10-04

## 2023-10-04 VITALS
BODY MASS INDEX: 18.43 KG/M2 | TEMPERATURE: 98.6 F | WEIGHT: 104 LBS | DIASTOLIC BLOOD PRESSURE: 51 MMHG | HEIGHT: 63 IN | SYSTOLIC BLOOD PRESSURE: 95 MMHG | OXYGEN SATURATION: 94 % | HEART RATE: 70 BPM

## 2023-10-04 PROCEDURE — 99213 OFFICE O/P EST LOW 20 MIN: CPT

## 2023-10-23 ENCOUNTER — APPOINTMENT (OUTPATIENT)
Dept: THORACIC SURGERY | Facility: CLINIC | Age: 88
End: 2023-10-23
Payer: COMMERCIAL

## 2023-10-23 VITALS
OXYGEN SATURATION: 96 % | SYSTOLIC BLOOD PRESSURE: 105 MMHG | HEIGHT: 63 IN | DIASTOLIC BLOOD PRESSURE: 69 MMHG | WEIGHT: 105 LBS | RESPIRATION RATE: 18 BRPM | BODY MASS INDEX: 18.61 KG/M2 | HEART RATE: 85 BPM

## 2023-10-23 DIAGNOSIS — T81.89XA OTHER COMPLICATIONS OF PROCEDURES, NOT ELSEWHERE CLASSIFIED, INITIAL ENCOUNTER: ICD-10-CM

## 2023-10-23 PROCEDURE — 99203 OFFICE O/P NEW LOW 30 MIN: CPT

## 2023-10-25 ENCOUNTER — RX RENEWAL (OUTPATIENT)
Age: 88
End: 2023-10-25

## 2023-10-27 PROBLEM — T81.89XA SUTURE SINUS, INITIAL ENCOUNTER: Status: ACTIVE | Noted: 2023-10-04

## 2023-10-29 ENCOUNTER — RESULT REVIEW (OUTPATIENT)
Age: 88
End: 2023-10-29

## 2023-10-30 ENCOUNTER — RESULT REVIEW (OUTPATIENT)
Age: 88
End: 2023-10-30

## 2023-10-30 ENCOUNTER — APPOINTMENT (OUTPATIENT)
Dept: THORACIC SURGERY | Facility: HOSPITAL | Age: 88
End: 2023-10-30

## 2023-10-30 ENCOUNTER — TRANSCRIPTION ENCOUNTER (OUTPATIENT)
Age: 88
End: 2023-10-30

## 2023-11-22 ENCOUNTER — APPOINTMENT (OUTPATIENT)
Dept: GERIATRICS | Facility: HOME HEALTH | Age: 88
End: 2023-11-22

## 2023-11-24 ENCOUNTER — RX RENEWAL (OUTPATIENT)
Age: 88
End: 2023-11-24

## 2023-11-30 ENCOUNTER — APPOINTMENT (OUTPATIENT)
Dept: GERIATRICS | Facility: HOME HEALTH | Age: 88
End: 2023-11-30
Payer: COMMERCIAL

## 2023-11-30 VITALS
HEART RATE: 102 BPM | TEMPERATURE: 96.8 F | SYSTOLIC BLOOD PRESSURE: 95 MMHG | OXYGEN SATURATION: 95 % | BODY MASS INDEX: 18.25 KG/M2 | WEIGHT: 103 LBS | DIASTOLIC BLOOD PRESSURE: 60 MMHG

## 2023-11-30 DIAGNOSIS — R63.4 ABNORMAL WEIGHT LOSS: ICD-10-CM

## 2023-11-30 PROCEDURE — 99349 HOME/RES VST EST MOD MDM 40: CPT

## 2023-11-30 RX ORDER — MUPIROCIN 2 G/100G
2 CREAM TOPICAL
Qty: 2 | Refills: 3 | Status: DISCONTINUED | COMMUNITY
Start: 2023-02-08 | End: 2023-11-30

## 2023-11-30 RX ORDER — MUPIROCIN 20 MG/G
2 OINTMENT TOPICAL 3 TIMES DAILY
Qty: 1 | Refills: 5 | Status: DISCONTINUED | COMMUNITY
Start: 2022-12-14 | End: 2023-11-30

## 2023-12-26 ENCOUNTER — RX RENEWAL (OUTPATIENT)
Age: 88
End: 2023-12-26

## 2024-02-14 ENCOUNTER — APPOINTMENT (OUTPATIENT)
Dept: GERIATRICS | Facility: HOME HEALTH | Age: 89
End: 2024-02-14
Payer: COMMERCIAL

## 2024-02-14 VITALS
OXYGEN SATURATION: 98 % | SYSTOLIC BLOOD PRESSURE: 105 MMHG | WEIGHT: 109.2 LBS | BODY MASS INDEX: 19.34 KG/M2 | DIASTOLIC BLOOD PRESSURE: 70 MMHG | HEART RATE: 82 BPM

## 2024-02-14 DIAGNOSIS — L85.3 XEROSIS CUTIS: ICD-10-CM

## 2024-02-14 PROCEDURE — 99349 HOME/RES VST EST MOD MDM 40: CPT

## 2024-02-14 RX ORDER — FUROSEMIDE 40 MG/1
40 TABLET ORAL DAILY
Qty: 30 | Refills: 4 | Status: DISCONTINUED | COMMUNITY
Start: 2022-05-17 | End: 2024-02-14

## 2024-02-14 RX ORDER — HYDROCORTISONE 1 %
12 CREAM (GRAM) TOPICAL
Refills: 0 | Status: ACTIVE | COMMUNITY
Start: 2024-02-14

## 2024-02-14 NOTE — PHYSICAL EXAM
[Alert] : alert [No Oral Pallor] : no oral pallor [No Oral Cyanosis] : no oral cyanosis [Normal Outer Ear/Nose] : the ears and nose were normal in appearance [Normal Lips/Gums] : the lips and gums were normal [No Respiratory Distress] : no respiratory distress [No Acc Muscle Use] : no accessory muscle use [Respiration, Rhythm And Depth] : normal respiratory rhythm and effort [Auscultation Breath Sounds / Voice Sounds] : lungs were clear to auscultation bilaterally [Normal S1, S2] : normal S1 and S2 [Heart Rate And Rhythm] : heart rate was normal and rhythm regular [Edema] : edema was not present [Cervical Lymph Nodes Enlarged Posterior Bilaterally] : posterior cervical [Supraclavicular Lymph Nodes Enlarged Bilaterally] : supraclavicular [No Spinal Tenderness] : no spinal tenderness [] : no rash [Skin Lesions] : no skin lesions [Normal] : normal affect and normal mood [de-identified] : thin frail elderly man - looks well - doing better can walk across room,  [de-identified] : midline surgical scar, small scabbed well healed area when wire removed  heart irreg   systolic murmur [de-identified] : dry skin [de-identified] : wearing depends [de-identified] : needs assistance [de-identified] : lesion on chest is removed - was a sternal    wire

## 2024-02-14 NOTE — HISTORY OF PRESENT ILLNESS
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 h/o aortic valve replaced in [place] 2007 elevated bilirubin - mild ascites, gallstones  he had covid  vaccines - booster  in April had pneumonia vaccines (?details)  pmd - Dr CARSON - [phone]  Dr Tapia - cards  retired       7/21/22 pt is seen at home with wife and daughter I am new MD for him he is feeling very  well no shortness of breath no chest pain did not use debrox drops brought copy of labs for for him and reviewed encourage banaana and is on extra Kdur tabs  eating well went to podiatry - got debrox but did not use his pills are all blister packed - complinace is good  9/29/22 daughter [phone] with him and wife needs flu vaccine has been stable no hospitalizations need to get info re pneumonia  vaccines needs covid booster  did not use debrox - will do next time  12/14/22 pt has been well eating well moved apts to be more accesible uses walker - sob on exertion going to ear  today needs prevnar 20 - had prevnar 13 only in 2018 has had flu vaccine and covid booster there is infected skin lesion mid chest - photo taken  2/8/23 pt has been well no hospitalizations doing calligraphy breathing is better does follow with cardiologist has chronic cough sputum daughter bought mucinex but has not tried skin lesion on chest appears more inflamed  5/3/23 here with wife and daughter pt hss been feeling well denies sob on exertion enjoys bingo at [facility] had cardiac check up and got good report had labs in feb 7/12/23 pt has had a cough he had a chest xray at [facility]  6/28/23has small/moderate loculated effusion - rt saw Dr Meyer yesterday - did not think tapping lung would help symptoms he is on his meds he uses mucinex for cough no fever not short of breath not coughing during my visit  9/14/23 pt has been well did go to derm - had steroid injection to chest wall lesion - did not help - stable size - painful uses mucinex has been to dr ledesma [facility] - has pill packs - reviewed needs flu vaccine  11/30/23 had sternal wire removed at [facility] healing well pt c/o often palpiations cough little appetite poor vision  cannot sleep well at night -   creat 1.5  2/14/24 pt saw cards off [facility] - just on torsemide on lac hydrin for dry skin robitussin dm for cough also has [facility] NP coming  breathing is comfortable went to [facility]

## 2024-02-14 NOTE — REVIEW OF SYSTEMS
[Shortness Of Breath] : shortness of breath [Dry Skin] : dry skin [Difficulty Walking] : difficulty walking [Negative] : Heme/Lymph

## 2024-02-14 NOTE — ASSESSMENT
[FreeTextEntry1] : pt with complicated medical history as above will try to consolidate care/communications at [facility] needs pulmonary and cardiologist input consider consult sanaz wray AAA - 5.3 cm check on pneumonia vaccines status refuses  home PT today will check labs today debrox rt ear   7/21/22 pt is feeling much better does not want intervention on aneurysm at this time walking better - breathing better labs wnl has all meds  9/29/22 given flu vaccine left amr needs covid vaccines check on pneumonia vaccines - daughter states she will do needs to do   debrox vss encouraged to drink boost - weight is down 3 lbs afib stable -  vss  12/14/22 pt is doing well gave prevnar 20 in left arm has had flu and  covid vaccines going to ENT called in bactroban for skin infection - took photo  2/8/23 skin lesion is worse did not use bactroban if does not resovle - needs derm daughter aware  checking labs  using mucinex  cadiac - stable copd stable with med  meds are blister packed  5/3/23 skin lesion on chest not healed daughter has no of derm - enoc call cardiac -- stable labs  good copd stable has all meds  needs covid booster  7/12/23 still needs derm f/up saw dr fields - has pleural effusion but feelig well no utility in draining will cont current meds  2/14/24 pt doing better weight up 6 lbs oxygen improved BP better walking better sternal wire removal - chest healed has lac hydrin for dry skin has all meds

## 2024-02-15 ENCOUNTER — LABORATORY RESULT (OUTPATIENT)
Age: 89
End: 2024-02-15

## 2024-02-16 ENCOUNTER — LABORATORY RESULT (OUTPATIENT)
Age: 89
End: 2024-02-16

## 2024-02-19 ENCOUNTER — LABORATORY RESULT (OUTPATIENT)
Age: 89
End: 2024-02-19

## 2024-02-19 ENCOUNTER — RX RENEWAL (OUTPATIENT)
Age: 89
End: 2024-02-19

## 2024-02-21 ENCOUNTER — LABORATORY RESULT (OUTPATIENT)
Age: 89
End: 2024-02-21

## 2024-03-22 ENCOUNTER — RX RENEWAL (OUTPATIENT)
Age: 89
End: 2024-03-22

## 2024-03-30 ENCOUNTER — RX RENEWAL (OUTPATIENT)
Age: 89
End: 2024-03-30

## 2024-03-30 RX ORDER — SENNOSIDES 8.6 MG TABLETS 8.6 MG/1
8.6 TABLET ORAL
Qty: 60 | Refills: 4 | Status: ACTIVE | COMMUNITY
Start: 2023-11-24 | End: 1900-01-01

## 2024-03-30 RX ORDER — ALBUTEROL SULFATE 90 UG/1
108 (90 BASE) INHALANT RESPIRATORY (INHALATION)
Qty: 3 | Refills: 3 | Status: ACTIVE | COMMUNITY
Start: 2023-09-20 | End: 1900-01-01

## 2024-05-22 ENCOUNTER — APPOINTMENT (OUTPATIENT)
Dept: GERIATRICS | Facility: HOME HEALTH | Age: 89
End: 2024-05-22
Payer: COMMERCIAL

## 2024-05-22 VITALS
SYSTOLIC BLOOD PRESSURE: 110 MMHG | HEART RATE: 71 BPM | DIASTOLIC BLOOD PRESSURE: 70 MMHG | WEIGHT: 108.9 LBS | OXYGEN SATURATION: 94 % | BODY MASS INDEX: 19.29 KG/M2 | TEMPERATURE: 96 F

## 2024-05-22 DIAGNOSIS — I27.23 PULMONARY HYPERTENSION DUE TO LUNG DISEASES AND HYPOXIA: ICD-10-CM

## 2024-05-22 DIAGNOSIS — I48.91 UNSPECIFIED ATRIAL FIBRILLATION: ICD-10-CM

## 2024-05-22 DIAGNOSIS — Z95.2 PRESENCE OF PROSTHETIC HEART VALVE: ICD-10-CM

## 2024-05-22 DIAGNOSIS — I50.32 CHRONIC DIASTOLIC (CONGESTIVE) HEART FAILURE: ICD-10-CM

## 2024-05-22 DIAGNOSIS — J44.9 CHRONIC OBSTRUCTIVE PULMONARY DISEASE, UNSPECIFIED: ICD-10-CM

## 2024-05-22 PROCEDURE — 99349 HOME/RES VST EST MOD MDM 40: CPT

## 2024-05-22 RX ORDER — PANTOPRAZOLE 40 MG/1
40 TABLET, DELAYED RELEASE ORAL DAILY
Qty: 90 | Refills: 3 | Status: ACTIVE | COMMUNITY
Start: 2022-07-21 | End: 1900-01-01

## 2024-05-22 RX ORDER — APIXABAN 2.5 MG/1
2.5 TABLET, FILM COATED ORAL
Qty: 180 | Refills: 3 | Status: ACTIVE | COMMUNITY
Start: 2022-05-17 | End: 1900-01-01

## 2024-05-22 RX ORDER — DIGOXIN 125 UG/1
125 TABLET ORAL
Qty: 90 | Refills: 3 | Status: ACTIVE | COMMUNITY
Start: 2022-05-17 | End: 1900-01-01

## 2024-05-22 RX ORDER — POTASSIUM CHLORIDE 1500 MG/1
20 TABLET, EXTENDED RELEASE ORAL DAILY
Qty: 90 | Refills: 3 | Status: ACTIVE | COMMUNITY
Start: 2022-05-17 | End: 1900-01-01

## 2024-05-22 RX ORDER — TORSEMIDE 100 MG/1
100 TABLET ORAL DAILY
Refills: 0 | Status: DISCONTINUED | COMMUNITY
Start: 2023-09-14 | End: 2024-05-22

## 2024-05-22 RX ORDER — SPIRONOLACTONE 25 MG/1
25 TABLET ORAL DAILY
Refills: 0 | Status: DISCONTINUED | COMMUNITY
Start: 2023-09-14 | End: 2024-05-22

## 2024-05-22 RX ORDER — FINASTERIDE 5 MG/1
5 TABLET, FILM COATED ORAL
Qty: 90 | Refills: 3 | Status: ACTIVE | COMMUNITY
Start: 2022-05-17 | End: 1900-01-01

## 2024-05-22 RX ORDER — FUROSEMIDE 40 MG/1
40 TABLET ORAL DAILY
Qty: 90 | Refills: 3 | Status: ACTIVE | COMMUNITY
Start: 2024-05-22

## 2024-05-22 RX ORDER — METOPROLOL SUCCINATE 25 MG/1
25 TABLET, EXTENDED RELEASE ORAL
Qty: 180 | Refills: 3 | Status: ACTIVE | COMMUNITY
Start: 2022-05-17 | End: 1900-01-01

## 2024-05-22 NOTE — ASSESSMENT
[FreeTextEntry1] : pt with complicated medical history as above will try to consolidate care/communications at [facility] needs pulmonary and cardiologist input consider consult sanaz wray AAA - 5.3 cm check on pneumonia vaccines status refuses  home PT today will check labs today debrox rt ear   7/21/22 pt is feeling much better does not want intervention on aneurysm at this time walking better - breathing better labs wnl has all meds  9/29/22 given flu vaccine left amr needs covid vaccines check on pneumonia vaccines - daughter states she will do needs to do   debrox vss encouraged to drink boost - weight is down 3 lbs afib stable -  vss  12/14/22 pt is doing well gave prevnar 20 in left arm has had flu and  covid vaccines going to ENT called in bactroban for skin infection - took photo  2/8/23 skin lesion is worse did not use bactroban if does not resovle - needs derm daughter aware  checking labs  using mucinex  cadiac - stable copd stable with med  meds are blister packed  5/3/23 skin lesion on chest not healed daughter has no of derm - enoc call cardiac -- stable labs  good copd stable has all meds  needs covid booster  7/12/23 still needs derm f/up saw dr fields - has pleural effusion but feelig well no utility in draining will cont current meds   5/22/24 pt is stable some changes in meds no longer on torsemide on lasix off spironolactone unclear who changed meds on whole toprol now renewed meds as they are now

## 2024-05-22 NOTE — PHYSICAL EXAM
[Alert] : alert [No Oral Pallor] : no oral pallor [No Oral Cyanosis] : no oral cyanosis [Normal Outer Ear/Nose] : the ears and nose were normal in appearance [Normal Lips/Gums] : the lips and gums were normal [No Respiratory Distress] : no respiratory distress [No Acc Muscle Use] : no accessory muscle use [Respiration, Rhythm And Depth] : normal respiratory rhythm and effort [Auscultation Breath Sounds / Voice Sounds] : lungs were clear to auscultation bilaterally [Normal S1, S2] : normal S1 and S2 [Heart Rate And Rhythm] : heart rate was normal and rhythm regular [Edema] : edema was not present [Cervical Lymph Nodes Enlarged Posterior Bilaterally] : posterior cervical [Supraclavicular Lymph Nodes Enlarged Bilaterally] : supraclavicular [No Spinal Tenderness] : no spinal tenderness [] : no rash [Skin Lesions] : no skin lesions [Normal] : normal affect and normal mood [de-identified] : thin frail elderly man - looks well - doing better can walk across room, occas cough [de-identified] : midline surgical scar, well healed area when wire removed  heart irreg   systolic murmur [de-identified] : dry skin [de-identified] : wearing depends [de-identified] : needs assistance [de-identified] : lesion on chest is removed - was a sternal    wire

## 2024-05-22 NOTE — REVIEW OF SYSTEMS
[Feeling Poorly] : not feeling poorly [Feeling Tired] : feeling tired [Recent Weight Gain (___ Lbs)] : no recent weight gain [Loss Of Hearing] : hearing loss [Cough] : cough [Constipation] : constipation [Incontinence] : incontinence [Arthralgias] : arthralgias [Negative] : Heme/Lymph

## 2024-05-22 NOTE — HISTORY OF PRESENT ILLNESS
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 h/o aortic valve replaced in [place] 2007 elevated bilirubin - mild ascites, gallstones  he had covid  vaccines - booster  in April had pneumonia vaccines (?details)  pmd - Dr CARSON - [phone]  Dr Tapia - [facility]  retired       7/21/22 pt is seen at home with wife and daughter I am new MD for him he is feeling very  well no shortness of breath no chest pain did not use debrox drops brought copy of labs for for him and reviewed encourage banaana and is on extra Kdur tabs  eating well went to podiatry - got debrox but did not use his pills are all blister packed - complinace is good  9/29/22 daughter [phone] with him and wife needs flu vaccine has been stable no hospitalizations need to get info re pneumonia  vaccines needs covid booster  did not use debrox - will do next time  12/14/22 pt has been well eating well moved apts to be more accesible uses walker - sob on exertion going to ear  today needs prevnar 20 - had prevnar 13 only in 2018 has had flu vaccine and covid booster there is infected skin lesion mid chest - photo taken  2/8/23 pt has been well no hospitalizations doing calligraphy breathing is better does follow with cardiologist has chronic cough sputum daughter bought mucinex but has not tried skin lesion on chest appears more inflamed  5/3/23 here with wife and daughter pt hss been feeling well denies sob on exertion enjoys bingo at [facility] had cardiac check up and got good report had labs in feb 7/12/23 pt has had a cough he had a chest xray at [facility]  6/28/23has small/moderate loculated effusion - rt saw Dr Meyer yesterday - did not think tapping lung would help symptoms he is on his meds he uses mucinex for cough no fever not short of breath not coughing during my visit  9/14/23 pt has been well did go to derm - had steroid injection to chest wall lesion - did not help - stable size - painful uses mucinex has been to dr ledesma [facility] - has pill packs - reviewed needs flu vaccine  11/30/23 had sternal wire removed at [facility] healing well pt c/o often palpiations cough little appetite poor vision  cannot sleep well at night -   creat 1.5  2/14/24 pt saw cards off [facility] - just on torsemide on lac hydrin for dry skin robitussin dm for cough also has [facility] NP coming  breathing is comfortable went to [facility]  5/22/24 pt has chronic cough - with lear sputum no chest pain now on lasix - not torsemide uses robitussin

## 2024-07-29 ENCOUNTER — RESULT REVIEW (OUTPATIENT)
Age: 89
End: 2024-07-29

## 2024-07-31 ENCOUNTER — TRANSCRIPTION ENCOUNTER (OUTPATIENT)
Age: 89
End: 2024-07-31

## 2024-08-01 ENCOUNTER — APPOINTMENT (OUTPATIENT)
Dept: GERIATRICS | Facility: HOME HEALTH | Age: 89
End: 2024-08-01